# Patient Record
Sex: FEMALE
[De-identification: names, ages, dates, MRNs, and addresses within clinical notes are randomized per-mention and may not be internally consistent; named-entity substitution may affect disease eponyms.]

---

## 2019-05-30 PROBLEM — Z00.00 ENCOUNTER FOR PREVENTIVE HEALTH EXAMINATION: Status: ACTIVE | Noted: 2019-05-30

## 2019-06-04 ENCOUNTER — APPOINTMENT (OUTPATIENT)
Dept: VASCULAR SURGERY | Facility: CLINIC | Age: 44
End: 2019-06-04
Payer: COMMERCIAL

## 2019-06-04 VITALS
WEIGHT: 179 LBS | DIASTOLIC BLOOD PRESSURE: 84 MMHG | HEIGHT: 65 IN | HEART RATE: 51 BPM | BODY MASS INDEX: 29.82 KG/M2 | SYSTOLIC BLOOD PRESSURE: 138 MMHG | OXYGEN SATURATION: 99 % | TEMPERATURE: 98.1 F

## 2019-06-04 DIAGNOSIS — I83.93 ASYMPTOMATIC VARICOSE VEINS OF BILATERAL LOWER EXTREMITIES: ICD-10-CM

## 2019-06-04 DIAGNOSIS — I83.10 VARICOSE VEINS OF UNSPECIFIED LOWER EXTREMITY WITH INFLAMMATION: ICD-10-CM

## 2019-06-04 DIAGNOSIS — I83.813 VARICOSE VEINS OF BILATERAL LOWER EXTREMITIES WITH PAIN: ICD-10-CM

## 2019-06-04 DIAGNOSIS — R25.2 CRAMP AND SPASM: ICD-10-CM

## 2019-06-04 DIAGNOSIS — I83.893 VARICOSE VEINS OF BILATERAL LOWER EXTREMITIES WITH OTHER COMPLICATIONS: ICD-10-CM

## 2019-06-04 DIAGNOSIS — Z82.49 FAMILY HISTORY OF ISCHEMIC HEART DISEASE AND OTHER DISEASES OF THE CIRCULATORY SYSTEM: ICD-10-CM

## 2019-06-04 DIAGNOSIS — Z84.1 FAMILY HISTORY OF DISORDERS OF KIDNEY AND URETER: ICD-10-CM

## 2019-06-04 DIAGNOSIS — Z83.49 FAMILY HISTORY OF OTHER ENDOCRINE, NUTRITIONAL AND METABOLIC DISEASES: ICD-10-CM

## 2019-06-04 PROCEDURE — 93970 EXTREMITY STUDY: CPT

## 2019-06-04 PROCEDURE — 99203 OFFICE O/P NEW LOW 30 MIN: CPT

## 2019-06-04 NOTE — ADDENDUM
[FreeTextEntry1] : CEAP Classification:\par []     C0----No visible or palpable signs of venous disease\par x     C1----Telangiectasia or reticular veins\par []     C2----Varicose veins; distinguished from reticular veins by a diameter of 3mm or more.\par []     C3----Edema\par []     N9b--Yzbzdse in skin and subcutaneous tissues secondary to CVD---Pigmentation or eczema\par []     P8n--Zyslotz in skin and subcutaneous tissues secondary to CVD---Lipodermatosclerosis or atrophic edward\par []     C5----Healed venous ulcer\par []     C6----Active venous ulcer\par x     S ----Symptoms including ache, pain, tightness, skin irritation, heaviness, muscle cramps, and other venous dysfunction\par []     A ----Asymptomatic\par CEAP Sclore  =   C1, S\par \par \par Attribute                            Absent=0                    Mild=1                                   Moderate=2                                           Severe=3\par 1         Pain                          None                          Occasional                           Daily, moderate                                      Daily, severe\par          \par 1        Varicose Veins         None                          Few: branch VV                  Multiple: GS VV                                      Extensive: GS & LS   \par \par   0      Venous Edema         None                         Evening ankle only                Afternoon above ankle                          Morning above ankle\par \par 0        Skin Pigmentation      None or low             Diffuse, limited, old brown    Diffuse, lower 1/3, recent pigment        Above lower 1/3, and recent pigment\par \par  0       Inflammation              None                        Mild cellulitis                           Moderate cellulitis, lower 1/3                 Severe cellulitis, lower 1/3 & above\par \par   0      Induration                  None                        Focal (<5 cm)                        Medial or lateral, < lower 1/3                  Entire lower 1/3   \par \par  0       No.of active ulcers   0                              1                                             2                                                             >2\par \par  0       Active ulceration      None                       < 3 months                             > 3 months, < 1 year                              Not healed > 1 year\par \par  0       Active ulcer, size     None                       < 2 cm diameter                     2-6 cm diameter                                      > 6 cm diameter      \par \par  2       Compressive tx        No use/compliant    Intermittent use                      Wears most days                                   Full compliance\par \par \par       TOTAL (max 30 pts) Venous Clinical Severity Score\par

## 2019-06-04 NOTE — PHYSICAL EXAM
[JVD] : no jugular venous distention  [Ankle Swelling (On Exam)] : not present [2+] : right 2+ [Ankle Swelling Bilaterally] : bilaterally  [Varicose Veins Of Lower Extremities] : bilaterally [Ankle Swelling On The Left] : moderate [] : bilaterally [No Rash or Lesion] : No rash or lesion [Petechiae] : no petechiae [Purpura] : no purpura  [Skin Ulcer] : no ulcer [Skin Induration] : no induration [Alert] : alert [Oriented to Place] : oriented to place [Oriented to Person] : oriented to person [Calm] : calm [Oriented to Time] : oriented to time [de-identified] : wdwn nad [de-identified] : tll [de-identified] : wnl [FreeTextEntry1] : b/l leg diffuse superficial clusters branch varicose veins [de-identified] : wnl [de-identified] : as above

## 2019-06-04 NOTE — REASON FOR VISIT
[FreeTextEntry1] : Patient is here with c/o painful superficial branch varicose veins Bilateral leg and reports Bilateral leg cramping. She does not wear compression stockings and no history of venous treatment.  Significant family history of varicose veins - mother and maternal grandmother.  VLE today confirms no DVT, no SVT and no venous reflux Bilateral leg.  She has superficial branch varicose veins Bilaterally.  Recommend thigh high medical grade 20-30 mmHg compression stockings to be worn daily and provided patient with prescription.  She would benefit from Bilateral leg sclerotherapy and she will schedule appointment.

## 2019-06-04 NOTE — ASSESSMENT
[Arterial/Venous Disease] : arterial/venous disease [Other: _____] : [unfilled] [FreeTextEntry1] : Injection sclerotherapy bilateral lower extremities \par \par Doctor’s office procedure\par \par Ms. LISA COREAS has been seen by me for vascular consultation and evaluation of painful small branch varicose veins.  This patient has attempted conservative measures with support hose and leg elevation and has had no satisfactory results.  The patient has intact pulses with no evidence of arterial disease.  Patient is complaining of pain, swelling, itching, stabbing, burning, tenderness and cramping which is interfering with daily duties and activities.  \par \par Reviewed with patient that sclerotherapy is the injection of a sterile agent (polidocanol) into the small branch varicose veins.  It works by damaging the inner wall of the vein.  Eventually, the vein collapses on itself and over time, the damaged vein is replaced with fibrous connective tissue and prevents blood flow through the vessel. Sclerotherapy does not prevent the development of new veins.  Before the treatment, photos may be obtained.   \par \par Dr. Stewart explained the risks and benefits of sclerotherapy.  Patient should not receive polidocanol if they have a known clotting disorder, have a known allergy to polidocanol, are pregnant or nursing.  Individual results may vary, and the patient may require multiple injection sessions for optimal treatment success.  Each session consists of total dose of 4 mL, 0.5% sclerosing agent (polidocanol) injected, using a very fine needle (30 gauge), into the veins of the treated leg.  Slight stinging may be experienced with each injection.  The skin may look irritated after the treatment and/or bruising may be noted at injection sites.  Irritation and bruising will resolve with time.   One leg is treated at a time and if the patient needs to return for multiple sessions, the patient may return every 2 weeks for additional sessions.  Patient instructed to wear compression stocking continuously for 72 hours following the treatment and may go about their normal activities after the session.  \par \par